# Patient Record
Sex: MALE | Race: WHITE | NOT HISPANIC OR LATINO
[De-identification: names, ages, dates, MRNs, and addresses within clinical notes are randomized per-mention and may not be internally consistent; named-entity substitution may affect disease eponyms.]

---

## 2021-06-26 ENCOUNTER — RESULT REVIEW (OUTPATIENT)
Age: 46
End: 2021-06-26

## 2021-07-23 ENCOUNTER — APPOINTMENT (OUTPATIENT)
Dept: FAMILY MEDICINE | Facility: CLINIC | Age: 46
End: 2021-07-23
Payer: COMMERCIAL

## 2021-07-23 ENCOUNTER — TRANSCRIPTION ENCOUNTER (OUTPATIENT)
Age: 46
End: 2021-07-23

## 2021-07-23 VITALS
HEIGHT: 70 IN | RESPIRATION RATE: 16 BRPM | OXYGEN SATURATION: 98 % | HEART RATE: 81 BPM | WEIGHT: 238 LBS | BODY MASS INDEX: 34.07 KG/M2 | TEMPERATURE: 99.5 F | SYSTOLIC BLOOD PRESSURE: 148 MMHG | DIASTOLIC BLOOD PRESSURE: 84 MMHG

## 2021-07-23 DIAGNOSIS — S16.1XXA STRAIN OF MUSCLE, FASCIA AND TENDON AT NECK LEVEL, INITIAL ENCOUNTER: ICD-10-CM

## 2021-07-23 PROCEDURE — 36415 COLL VENOUS BLD VENIPUNCTURE: CPT

## 2021-07-23 PROCEDURE — G0442 ANNUAL ALCOHOL SCREEN 15 MIN: CPT

## 2021-07-23 PROCEDURE — 99386 PREV VISIT NEW AGE 40-64: CPT | Mod: 25

## 2021-07-23 PROCEDURE — 99072 ADDL SUPL MATRL&STAF TM PHE: CPT

## 2021-07-23 PROCEDURE — G0444 DEPRESSION SCREEN ANNUAL: CPT | Mod: 59

## 2021-07-23 PROCEDURE — G0447 BEHAVIOR COUNSEL OBESITY 15M: CPT

## 2021-07-26 PROBLEM — S16.1XXA NECK STRAIN: Status: ACTIVE | Noted: 2021-07-23

## 2021-07-26 RX ORDER — ASPIRIN 81 MG
81 TABLET,CHEWABLE ORAL DAILY
Refills: 0 | Status: ACTIVE | COMMUNITY

## 2021-07-26 NOTE — COUNSELING
[Behavioral health counseling provided] : Behavioral health counseling provided [Engage in a relaxing activity] : Engage in a relaxing activity [Risk of tobacco use and health benefits of smoking cessation discussed] : Risk of tobacco use and health benefits of smoking cessation discussed [Hazards of at-risk alcohol use discussed] : Hazards of at-risk alcohol use discussed [Strategies to reduce or eliminate alcohol use discussed] : Strategies to reduce or eliminate alcohol use discussed [Potential consequences of obesity discussed] : Potential consequences of obesity discussed [Benefits of weight loss discussed] : Benefits of weight loss discussed [Encouraged to maintain food diary] : Encouraged to maintain food diary [Encouraged to increase physical activity] : Encouraged to increase physical activity [Encouraged to use exercise tracking device] : Encouraged to use exercise tracking device [Weigh Self Weekly] : weigh self weekly [Decrease Portions] : decrease portions [Good understanding] : Patient has a good understanding of disease, goals and obesity follow-up plan [FreeTextEntry4] : 15

## 2021-07-26 NOTE — HEALTH RISK ASSESSMENT
[] : Yes [Yes] : Yes [4 or more  times a week (4 pts)] : 4 or more  times a week (4 points) [3 or 4 (1 pt)] : 3 or 4  (1 point) [Weekly (3 pts)] : Weekly (3 points) [No falls in past year] : Patient reported no falls in the past year [0] : 2) Feeling down, depressed, or hopeless: Not at all (0) [PHQ-2 Negative - No further assessment needed] : PHQ-2 Negative - No further assessment needed [Good] : ~his/her~ current health as good [10-14] : 10-14 [No] : In the past 12 months have you used drugs other than those required for medical reasons? No [Audit-CScore] : 8 [de-identified] : decreased since move to [place] [de-identified] : varied [CFC4Nqobg] : 0 [Patient reported colonoscopy was normal] : Patient reported colonoscopy was normal [With Family] : lives with family [Employed] : employed [] :  [# Of Children ___] : has [unfilled] children [Sexually Active] : sexually active [High Risk Behavior] : no high risk behavior [Feels Safe at Home] : Feels safe at home [Fully functional (bathing, dressing, toileting, transferring, walking, feeding)] : Fully functional (bathing, dressing, toileting, transferring, walking, feeding) [Fully functional (using the telephone, shopping, preparing meals, housekeeping, doing laundry, using] : Fully functional and needs no help or supervision to perform IADLs (using the telephone, shopping, preparing meals, housekeeping, doing laundry, using transportation, managing medications and managing finances) [Reports changes in hearing] : Reports no changes in hearing [Reports changes in vision] : Reports no changes in vision [Reports normal functional visual acuity (ie: able to read med bottle)] : Reports normal functional visual acuity [Reports changes in dental health] : Reports no changes in dental health [ColonoscopyDate] : 2011 [FreeTextEntry2] : [place]

## 2021-07-26 NOTE — HISTORY OF PRESENT ILLNESS
Servando [FreeTextEntry1] : Annual physical [de-identified] : 47 yo M presenting for routine annual physical and ED follow up. Seen in [place] ED one week earlier for chest and upper back pain, likely cervical strain. However, given patient's hx of CAD, patient went to ED for care and to rule out heart pathology. In ED, patient given pain cocktail and pain significant improved. Has been under significant stress recently as just had birth of child and some minor complications caused many worries for him and his wife; baby all healthy at this point. Patient additionally reports he has not been sleeping well. Currently, pain is left sided, difficult to fully turn neck. Reports spasms have been present in neck x1 month with no significant relief. Denies new pills, or changes in sleeping patterns. Otherwise, patient denies exertional chest pain, shortness of breath, nausea/vomiting, diarrhea, constipation. He works in [employer] in [place] and has a very active job with heavy lifting. Patient initially from [place] but recently moved to [place]; previous care was all located in [place], including cardiologist.

## 2021-07-26 NOTE — PHYSICAL EXAM
[No Acute Distress] : no acute distress [Well Nourished] : well nourished [Well Developed] : well developed [Well-Appearing] : well-appearing [Normal Sclera/Conjunctiva] : normal sclera/conjunctiva [PERRL] : pupils equal round and reactive to light [EOMI] : extraocular movements intact [Normal Outer Ear/Nose] : the outer ears and nose were normal in appearance [Normal Oropharynx] : the oropharynx was normal [No JVD] : no jugular venous distention [No Lymphadenopathy] : no lymphadenopathy [Thyroid Normal, No Nodules] : the thyroid was normal and there were no nodules present [No Respiratory Distress] : no respiratory distress  [No Accessory Muscle Use] : no accessory muscle use [Clear to Auscultation] : lungs were clear to auscultation bilaterally [Normal Rate] : normal rate  [Regular Rhythm] : with a regular rhythm [Normal S1, S2] : normal S1 and S2 [No Murmur] : no murmur heard [No Carotid Bruits] : no carotid bruits [No Abdominal Bruit] : a ~M bruit was not heard ~T in the abdomen [No Varicosities] : no varicosities [Pedal Pulses Present] : the pedal pulses are present [No Edema] : there was no peripheral edema [No Palpable Aorta] : no palpable aorta [No Extremity Clubbing/Cyanosis] : no extremity clubbing/cyanosis [Soft] : abdomen soft [Non Tender] : non-tender [Non-distended] : non-distended [No Masses] : no abdominal mass palpated [No HSM] : no HSM [Normal Bowel Sounds] : normal bowel sounds [Normal Posterior Cervical Nodes] : no posterior cervical lymphadenopathy [Normal Anterior Cervical Nodes] : no anterior cervical lymphadenopathy [No CVA Tenderness] : no CVA  tenderness [No Spinal Tenderness] : no spinal tenderness [No Joint Swelling] : no joint swelling [Grossly Normal Strength/Tone] : grossly normal strength/tone [No Rash] : no rash [Coordination Grossly Intact] : coordination grossly intact [No Focal Deficits] : no focal deficits [Normal Gait] : normal gait [Deep Tendon Reflexes (DTR)] : deep tendon reflexes were 2+ and symmetric [Normal Affect] : the affect was normal [Normal Insight/Judgement] : insight and judgment were intact [de-identified] : spasms of upper back muscles, left sided, FROM of neck in all planes

## 2021-07-26 NOTE — PLAN
[FreeTextEntry1] : 45 yo M here for annual physical\par - PHQ2 negative\par - weight and exercise discussed; needs to lose weight\par - alcohol and smoking reviewed and discussed with patient\par - cardio referral for hx of CAD and to establish care with [facility] cardiologist

## 2021-07-29 LAB
25(OH)D3 SERPL-MCNC: 14.3 NG/ML
ALBUMIN SERPL ELPH-MCNC: 4.6 G/DL
ALP BLD-CCNC: 79 U/L
ALT SERPL-CCNC: 47 U/L
ANION GAP SERPL CALC-SCNC: 14 MMOL/L
AST SERPL-CCNC: 36 U/L
BASOPHILS # BLD AUTO: 0.06 K/UL
BASOPHILS NFR BLD AUTO: 0.8 %
BILIRUB SERPL-MCNC: 0.3 MG/DL
BUN SERPL-MCNC: 14 MG/DL
CALCIUM SERPL-MCNC: 9.3 MG/DL
CHLORIDE SERPL-SCNC: 103 MMOL/L
CHOLEST SERPL-MCNC: 173 MG/DL
CO2 SERPL-SCNC: 23 MMOL/L
CREAT SERPL-MCNC: 0.99 MG/DL
EOSINOPHIL # BLD AUTO: 0.11 K/UL
EOSINOPHIL NFR BLD AUTO: 1.4 %
ESTIMATED AVERAGE GLUCOSE: 105 MG/DL
GLUCOSE SERPL-MCNC: 109 MG/DL
HBA1C MFR BLD HPLC: 5.3 %
HCT VFR BLD CALC: 49.5 %
HDLC SERPL-MCNC: 44 MG/DL
HGB BLD-MCNC: 16.4 G/DL
IMM GRANULOCYTES NFR BLD AUTO: 0.4 %
LDLC SERPL CALC-MCNC: 57 MG/DL
LYMPHOCYTES # BLD AUTO: 1.55 K/UL
LYMPHOCYTES NFR BLD AUTO: 19.5 %
MAN DIFF?: NORMAL
MCHC RBC-ENTMCNC: 30.4 PG
MCHC RBC-ENTMCNC: 33.1 GM/DL
MCV RBC AUTO: 91.7 FL
MONOCYTES # BLD AUTO: 0.6 K/UL
MONOCYTES NFR BLD AUTO: 7.5 %
NEUTROPHILS # BLD AUTO: 5.61 K/UL
NEUTROPHILS NFR BLD AUTO: 70.4 %
NONHDLC SERPL-MCNC: 129 MG/DL
PLATELET # BLD AUTO: 186 K/UL
POTASSIUM SERPL-SCNC: 4.3 MMOL/L
PROT SERPL-MCNC: 7.1 G/DL
RBC # BLD: 5.4 M/UL
RBC # FLD: 12.8 %
SODIUM SERPL-SCNC: 141 MMOL/L
TRIGL SERPL-MCNC: 363 MG/DL
TSH SERPL-ACNC: 1.88 UIU/ML
WBC # FLD AUTO: 7.96 K/UL

## 2021-08-09 ENCOUNTER — APPOINTMENT (OUTPATIENT)
Dept: PAIN MANAGEMENT | Facility: CLINIC | Age: 46
End: 2021-08-09
Payer: COMMERCIAL

## 2021-08-09 ENCOUNTER — NON-APPOINTMENT (OUTPATIENT)
Age: 46
End: 2021-08-09

## 2021-08-09 VITALS
WEIGHT: 238 LBS | DIASTOLIC BLOOD PRESSURE: 91 MMHG | BODY MASS INDEX: 34.07 KG/M2 | SYSTOLIC BLOOD PRESSURE: 135 MMHG | TEMPERATURE: 98.2 F | HEIGHT: 70 IN

## 2021-08-09 PROCEDURE — 99244 OFF/OP CNSLTJ NEW/EST MOD 40: CPT

## 2021-08-09 NOTE — REASON FOR VISIT
[Initial Consultation] : an initial pain management consultation [FreeTextEntry2] : referred by Dr. Cifuentes  for evaluation of neck pain

## 2021-08-09 NOTE — PHYSICAL EXAM
[Spurling] : positive Spurling's Test [] : Motor: [NL] : normal and symmetric bilaterally [de-identified] : Constitutional: Normal, well developed, no acute distress\par Eyes: Symmetric, External structures \par Oropharynx: Lips normal, symmetric, no external lesions appreciated\par Respiratory: Non-labored breathing, no audible wheezes\par Cardiac: Pulse palpated, no tachycardia\par Vascular: No cyanosis appreciated, no edema in bilateral lower extremities\par GI: Nondistended, no jaundice appreciated\par Neurovascular: CN2-12 grossly intact, Alert and oriented\par MSK: Normal muscle bulk, 5/5 Motor strength B/L in LE\par \par

## 2021-08-09 NOTE — CONSULT LETTER
[Dear  ___] : Dear  [unfilled], [Consult Letter:] : I had the pleasure of evaluating your patient, [unfilled]. [Please see my note below.] : Please see my note below. [Consult Closing:] : Thank you very much for allowing me to participate in the care of this patient.  If you have any questions, please do not hesitate to contact me. [Sincerely,] : Sincerely, [FreeTextEntry3] : \par Destin Carlson DO, MBA\par Director, Pain Management Center\par  of Anesthesiology\par [facility] [employer] [facility]\par \par \par

## 2021-08-09 NOTE — ASSESSMENT
[FreeTextEntry1] : Neck and arm pain likely secondary to cervical radiculopathy and discogenic pain vs spondylosis refractory to conservative treatments including 6 consecutive weeks of PT/home exercises, will obtain MRI CS to evaluate for pathology\par \par may consider PT vs intervention pending eval\par \par PT functional evaluation\par \par gabapentin nightly - caution sedation and not to take prior to driving\par cautioned change in mood.  Encouraged to call with any worsening mood or depression/suicidal ideations\par \par The above diagnosis and treatment plan is medically reasonable and necessary based on the patient encounter.\par \par There were no barriers to communication.\par Informed patient that I would be available for any additional questions.\par Patient was instructed to call with any worsening symptoms including severe pain, new numbness/weakness, or changes in the bowel/bladder function. \par \par Discussed role of nsaids in pain management and all relevant risks, if patient is continuing to require after 4 weeks the patient should f/u for alternative treatment. \par \par Instructed patient to maintain pain diary to monitor pain level, mobility, and function.\par \par The referring provider was informed of the above diagnosis and treatment plan.\par

## 2021-08-09 NOTE — HISTORY OF PRESENT ILLNESS
[Back Pain] : back pain [___ wks] : [unfilled] week(s) ago [Constant] : constant [8] : a maximum pain level of 8/10 [Sharp] : sharp [Aching] : aching [Throbbing] : throbbing [Shooting] : shooting [Laying] : laying [Sitting] : sitting [Medications] : medications [Heat] : heat [FreeTextEntry1] : HPI\par \par Mr. KADE YOUNG is a 46 year M with pmhx of cad asa 81mg, presents with left neck pain radiating shoulder, lateral arm pain  Pain is so bad that patient finds it difficult to perform adls. denies any worsening numbness, weakness, bowel/bladder dysfunction. Worse at night. 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

## 2021-08-09 NOTE — REVIEW OF SYSTEMS
[Constipation] : constipation [Urinary Frequency] : urinary frequency [Back Pain] : back pain [Neck Pain] : neck pain [Joint Pain] : joint pain [Negative] : Heme/Lymph

## 2021-08-12 ENCOUNTER — RESULT REVIEW (OUTPATIENT)
Age: 46
End: 2021-08-12

## 2021-08-17 ENCOUNTER — APPOINTMENT (OUTPATIENT)
Dept: PAIN MANAGEMENT | Facility: CLINIC | Age: 46
End: 2021-08-17
Payer: COMMERCIAL

## 2021-08-17 PROCEDURE — 99214 OFFICE O/P EST MOD 30 MIN: CPT | Mod: 95

## 2021-08-17 NOTE — ASSESSMENT
[FreeTextEntry1] : Neck and arm pain likely secondary to cervical radiculopathy and discogenic pain vs spondylosis refractory to conservative treatments including 6 consecutive weeks of PT/home exercises, sp MRI CS to evaluate for pathology\par \par I personally reviewed the relevant imaging.  Discussed and explained to patient the likely source of pathology and pain.  Questions answered.\par \par \par MRI demonstrating disc herniation causing radiculopathy, significantly impactful to ability to perform ADL and refractory to conservative treatments, severe pain and unable to return to work.  Will schedule C7-T1 interlaminar epidural steroid injection r/b/a discussed\par \par informed patient of risks of steroid administration including transient worsening of blood glucose, htn, mood changes, progressive osteoporosis.  Encouraged to call with questions and concerns.\par \par patient will consult with cardiologist regarding hold asa 81mg  7 days prior to intervention, it is being used for possibly 2ndary prophylaxis, in which case it can be performed with asa as well\par \par Patient is scheduled for procedure based on history, imaging and limited physical exam performed on TeleHealth visit.  If necessary, additional focal physical exam will be performed on date of procedure\par \par Will schedule above interventional pain procedure because further delay may cause harm or negative outcome to patient.  The goal in performing this procedure is to avoid deterioration of function, emergency room visits (which increases exposure) and reliance on opioids.  \par \par r/b/a discussed with patient, lack of evidence to conclusively determine whether pain management procedures have any positive or negative impact on the possibility of danial the virus and/or development of any sequelae. \par \par Patient counselled regarding timing steroid based intervention 2 weeks before or after COVID-19 vaccine administration to avoid any interaction or affect on efficacy of vaccination\par \par Patient demonstrates understanding\par \par Informed patient that risks associated with the COVID-19 infection.  Informed patient steps taken to limit the risks.  We are implementing safety precautions and following protocols consistent with the CDC and state recommendations. All patients and staff will be checked for fever or signs of illness upon entry to the facility. We will limit our steroid dose to the lowest effective therapeutic dose or in some cases steroids will not be injected at all. \par \par Patient agrees to proceed\par \par \par PT functional evaluation\par \par gabapentin nightly - caution sedation and not to take prior to driving\par cautioned change in mood.  Encouraged to call with any worsening mood or depression/suicidal ideations\par \par The above diagnosis and treatment plan is medically reasonable and necessary based on the patient encounter.\par \par There were no barriers to communication.\par Informed patient that I would be available for any additional questions.\par Patient was instructed to call with any worsening symptoms including severe pain, new numbness/weakness, or changes in the bowel/bladder function. \par \par Discussed role of nsaids in pain management and all relevant risks, if patient is continuing to require after 4 weeks the patient should f/u for alternative treatment. \par \par Instructed patient to maintain pain diary to monitor pain level, mobility, and function.\par \par The referring provider was informed of the above diagnosis and treatment plan.\par

## 2021-08-17 NOTE — HISTORY OF PRESENT ILLNESS
[Medical Office: ([facility])___] : at the medical office located in  [Home] : at home, [unfilled] , at the time of the visit. [Verbal consent obtained from patient] : the patient, [unfilled] [Back Pain] : back pain [___ wks] : [unfilled] week(s) ago [Constant] : constant [8] : a maximum pain level of 8/10 [Sharp] : sharp [Throbbing] : throbbing [Aching] : aching [Shooting] : shooting [Laying] : laying [Sitting] : sitting [Medications] : medications [Heat] : heat [FreeTextEntry1] : Interval Note:\par \par Since last visit the pain is mildly improved.  Continues to have pain over left neck and shoulder, unable to return to work and he was evaluated by PT for function eval and recommended modified duty. Denies any additional weakness, numbness, bowel/bladder dysfunction.\par \par \par HPI\par \par Mr. KADE YOUNG is a 46 year M with pmhx of cad asa 81mg, presents with left neck pain radiating shoulder, lateral arm pain  Pain is so bad that patient finds it difficult to perform adls. denies any worsening numbness, weakness, bowel/bladder dysfunction. Worse at night. \par  \par \par Previous and current pain medications/doses/effects:\par \par tizanidine with mild improvement\par tylenol with mild improvement\par \par Previous Pain Treatments:\par \par PT without improvement\par \par Previous Pain Injections:\par \par na\par \par Previous Diagnostic Studies/Images:\par \par MRI CS 8/21\par \par There is straightening of the cervical spine. The vertebral bodies are of normal height\par and configuration. There is mild loss of disc height and T2 signal at the C4/C5, C5/C6 disc spaces\par consistent with mild desiccation. The remaining intervertebral discs spaces are within normal\par limits. Evaluation of the spinal cord demonstrates no evidence of abnormal signal changes.  The\par marrow signal is within normal limits.  The cervicomedullary junction is normal.\par \par  Evaluation of the individual levels demonstrates at the C2/C3 no evidence of a focal disc\par herniation or spinal cord compression. The bilateral neuroforamen are patent.\par \par  At the C3/C4 level there is no evidence of a focal disc herniation. The bilateral neuroforamen are\par patent. There is no evidence of spinal cord compression.\par \par  At the C4/C5 level there is a minimal diffuse disc bulge contacting the ventral thecal sac and\par contributing to moderate right foraminal narrowing. The left neuroforamen is patent. There is no\par evidence of spinal cord compression.\par \par  At the C5/C6 level there is a mild diffuse disc bulge with a superimposed small shallow left\par subarticular and foraminal disc protrusion flattening the ventral thecal sac, left greater than\par right and contacting the left ventral spinal cord. There is uncovertebral and facet hypertrophy.\par There is moderate to severe bilateral foraminal narrowing, left greater than right. There is no\par evidence of spinal cord compression.\par \par  At the C6/C7 level there is a minimal diffuse disc bulge contacting the ventral thecal sac. There\par is mild bilateral foraminal narrowing. There is no evidence of spinal cord compression.\par \par  At the C7/T1 level there is no evidence of a focal disc herniation. There is moderate left and mild\par right foraminal narrowing. There is no evidence of spinal cord compression.\par \par \par  Impression:\par \par  Straightening of the cervical spine.\par \par  C4/C5  minimal diffuse disc bulge  contributing to moderate right foraminal narrowing. C4/C5 no\par evidence of spinal cord compression.\par \par  C5/C6 mild diffuse disc bulge with a superimposed small shallow left subarticular and foraminal\par disc protrusion contacting the left ventral spinal cord with moderate to severe bilateral foraminal\par narrowing, left greater than right. C5/C6 no evidence of spinal cord compression.\par \par  C6/C7 minimal diffuse disc bulge with mild bilateral foraminal narrowing. 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

## 2021-08-17 NOTE — PHYSICAL EXAM
[de-identified] : \par Constitutional: Normal, well developed, no acute distress on audio/video examination\par Eyes: Symmetric, External structures on video examination\par ENT: Lips, mucosa and tongue normal on video examination\par Oropharynx: Lips normal, symmetric, no external lesions appreciated appreciated on video examination\par Respiratory: Non-labored breathing, no audible wheezes appreciated on audio/video examination\par Vascular: No cyanosis appreciated or edema appreciated on video examination\par GI:  no jaundice appreciated on video examination\par Neurovascular: CN grossly intact on video/audio examination, alert\par MSK: Normal muscle bulk on video examination\par

## 2021-08-19 ENCOUNTER — RX CHANGE (OUTPATIENT)
Age: 46
End: 2021-08-19

## 2021-08-19 RX ORDER — ERGOCALCIFEROL 1.25 MG/1
1.25 MG CAPSULE, LIQUID FILLED ORAL
Qty: 4 | Refills: 1 | Status: DISCONTINUED | COMMUNITY
Start: 2021-07-29 | End: 2021-08-19

## 2021-08-19 RX ORDER — SIMVASTATIN 20 MG/1
20 TABLET, FILM COATED ORAL
Refills: 0 | Status: ACTIVE | COMMUNITY

## 2021-08-19 NOTE — PHYSICAL EXAM
[Well Developed] : well developed [Well Nourished] : well nourished [No Acute Distress] : no acute distress [Normal Conjunctiva] : normal conjunctiva [Normal Venous Pressure] : normal venous pressure [No Carotid Bruit] : no carotid bruit [Normal S1, S2] : normal S1, S2 [No Murmur] : no murmur [No Rub] : no rub [No Gallop] : no gallop [Clear Lung Fields] : clear lung fields [Good Air Entry] : good air entry [No Respiratory Distress] : no respiratory distress  [Soft] : abdomen soft [Non Tender] : non-tender [No Masses/organomegaly] : no masses/organomegaly [Normal Bowel Sounds] : normal bowel sounds [Normal Gait] : normal gait [No Edema] : no edema [No Clubbing] : no clubbing [No Cyanosis] : no cyanosis [No Varicosities] : no varicosities [No Rash] : no rash [No Skin Lesions] : no skin lesions [Moves all extremities] : moves all extremities [No Focal Deficits] : no focal deficits [Normal Speech] : normal speech [Alert and Oriented] : alert and oriented [Normal memory] : normal memory

## 2021-08-26 ENCOUNTER — NON-APPOINTMENT (OUTPATIENT)
Age: 46
End: 2021-08-26

## 2021-08-26 ENCOUNTER — APPOINTMENT (OUTPATIENT)
Dept: CARDIOLOGY | Facility: CLINIC | Age: 46
End: 2021-08-26
Payer: COMMERCIAL

## 2021-08-26 VITALS
WEIGHT: 36 LBS | RESPIRATION RATE: 16 BRPM | DIASTOLIC BLOOD PRESSURE: 100 MMHG | OXYGEN SATURATION: 97 % | HEART RATE: 82 BPM | BODY MASS INDEX: 5.15 KG/M2 | HEIGHT: 70 IN | SYSTOLIC BLOOD PRESSURE: 148 MMHG

## 2021-08-26 PROCEDURE — 93000 ELECTROCARDIOGRAM COMPLETE: CPT

## 2021-08-26 PROCEDURE — 99204 OFFICE O/P NEW MOD 45 MIN: CPT

## 2021-08-26 RX ORDER — CYCLOBENZAPRINE HYDROCHLORIDE 5 MG/1
5 TABLET, FILM COATED ORAL
Qty: 20 | Refills: 0 | Status: COMPLETED | COMMUNITY
Start: 2021-07-09 | End: 2021-08-26

## 2021-08-26 RX ORDER — LOSARTAN POTASSIUM 50 MG/1
50 TABLET, FILM COATED ORAL DAILY
Refills: 0 | Status: DISCONTINUED | COMMUNITY
End: 2021-08-26

## 2021-08-26 NOTE — HISTORY OF PRESENT ILLNESS
[FreeTextEntry1] : The patient is a 46 year-old man referred by Dr. Cifuentes for evaluation of chest pain.\par \par The patient recently relocated to [place] from [facility] and had been under the care of a cardiologist there.\par \par He reports a past history of a NSTEMI 12 years ago. He was initially treated at [facility] in [place], where he was told of a small mi. He was transferred to [facility] in [facility], where CARDIAC CATH revealed nonobstructive dx, with a single 60% stenosis. He was treated medically.\par He quot smoking then (formerly 1/2 PPD--12 yrs ago). He does now smoke cigars\par \par Recent Scenario: The patient presented to [facility] complaining of chest discomfort. An acute coronary syndrome was ruled out and he was discharged. He was referred here.\par \par \par ----------------------------------------------------------------------------------------------------------------------------\par His past medical history is notable for:\par -Former cigarette smoker.\par - history of non-obstructive CAD and prior MI\par -Essential hypertension--on Losartan and metoprolol\par -Hyperlipidemia--on Simvastatin\par -Strong family history of CAD\par \par Labs: (July 2021):\par Chol 173,  LDL 57,    HDL 44,     TRig  363\par A1c 5.3\par CBC, CMP--Normal\par \par ECG today--NSR WNL\par

## 2021-08-26 NOTE — ASSESSMENT
[FreeTextEntry1] : #1) reported history of nonobstructive CAD with a 60% lesion more than a decade ago. No recent cardiovascular evaluation since. Recent ER visit for chest pain with typical and atypical features. No evidence of ACS. He is already on aspirin and beta blocker as well as statin.\par -We'll perform stress echocardiogram to re- risk stratify\par \par #2) essential hypertension and his blood pressure remains significantly elevated. I do believe that he would benefit from a diuretic and will also benefit from him his medications.\par -We'll discontinue metoprolol tartrate 25 b.i.d. and replace it with metoprolol succinate ER 50 mg once daily.\par -We'll discontinue losartan 50 and replace this with losartan hydrochlorothiazide 100/12.5\par \par #3) hyperlipidemia--on statin and at goal.\par \par #4) mild obesity\par \par #5) former cigarette smoker now smoking cigars. Patient advised to quit and provided with details how to do so\par \par Plan: And losartan hydrochlorothiazide, change metoprolol to succinate ER 50,\par           Schedule stress echocardiogram\par           Continue daily aspirin and simvastatin\par           Cessation of cigar smoking\par           Return to office to review stress test and recheck blood pressure

## 2021-08-30 ENCOUNTER — NON-APPOINTMENT (OUTPATIENT)
Age: 46
End: 2021-08-30

## 2021-09-02 ENCOUNTER — APPOINTMENT (OUTPATIENT)
Dept: PAIN MANAGEMENT | Facility: HOSPITAL | Age: 46
End: 2021-09-02

## 2021-09-13 ENCOUNTER — RESULT REVIEW (OUTPATIENT)
Age: 46
End: 2021-09-13

## 2021-09-14 ENCOUNTER — APPOINTMENT (OUTPATIENT)
Dept: PAIN MANAGEMENT | Facility: CLINIC | Age: 46
End: 2021-09-14
Payer: COMMERCIAL

## 2021-09-14 DIAGNOSIS — M54.12 RADICULOPATHY, CERVICAL REGION: ICD-10-CM

## 2021-09-14 DIAGNOSIS — M79.2 NEURALGIA AND NEURITIS, UNSPECIFIED: ICD-10-CM

## 2021-09-14 DIAGNOSIS — M79.18 MYALGIA, OTHER SITE: ICD-10-CM

## 2021-09-14 PROCEDURE — 99213 OFFICE O/P EST LOW 20 MIN: CPT | Mod: 95

## 2021-09-14 NOTE — ASSESSMENT
[FreeTextEntry1] : Neck and arm pain likely secondary to cervical radiculopathy and discogenic pain vs spondylosis refractory to conservative treatments including 6 consecutive weeks of PT/home exercises, sp MRI CS to evaluate for pathology\par \par I personally reviewed the relevant imaging.  Discussed and explained to patient the likely source of pathology and pain.  Questions answered.\par \par Pain is now much improved with PT\par \par patient able to return to work \par \par continue PT\par \par The above diagnosis and treatment plan is medically reasonable and necessary based on the patient encounter.\par \par There were no barriers to communication.\par Informed patient that I would be available for any additional questions.\par Patient was instructed to call with any worsening symptoms including severe pain, new numbness/weakness, or changes in the bowel/bladder function. \par \par Discussed role of nsaids in pain management and all relevant risks, if patient is continuing to require after 4 weeks the patient should f/u for alternative treatment. \par \par Instructed patient to maintain pain diary to monitor pain level, mobility, and function.\par \par I explained to patient benefits and limitation of TeleMedicine visits\par \par Patient understands that limitations include inability to perform comprehensive physical exam, which may lead to potential diagnostic inconsistencies.  \par \par Patient understands that diagnosis and treatment may be limited by these inconsistencies and patient agrees to proceed with care plan\par \par \par \par

## 2021-09-14 NOTE — PHYSICAL EXAM
[de-identified] : \par Constitutional: Normal, well developed, no acute distress on audio/video examination\par Eyes: Symmetric, External structures on video examination\par ENT: Lips, mucosa and tongue normal on video examination\par Oropharynx: Lips normal, symmetric, no external lesions appreciated appreciated on video examination\par Respiratory: Non-labored breathing, no audible wheezes appreciated on audio/video examination\par Vascular: No cyanosis appreciated or edema appreciated on video examination\par GI:  no jaundice appreciated on video examination\par Neurovascular: CN grossly intact on video/audio examination, alert\par MSK: Normal muscle bulk on video examination\par

## 2021-09-14 NOTE — HISTORY OF PRESENT ILLNESS
[Back Pain] : back pain [___ wks] : [unfilled] week(s) ago [Constant] : constant [8] : a maximum pain level of 8/10 [Sharp] : sharp [Aching] : aching [Throbbing] : throbbing [Shooting] : shooting [Laying] : laying [Sitting] : sitting [Medications] : medications [Heat] : heat [Home] : at home, [unfilled] , at the time of the visit. [Medical Office: ([facility])___] : at the medical office located in  [Verbal consent obtained from patient] : the patient, [unfilled] [FreeTextEntry1] : Interval Note:\par \par Patient is 95% improved in pain and has no difficulty moving his neck or performing adls. He continues PT. denies any worsening numbness, weakness, bowel/bladder dysfunction. \par \par \par HPI\par \par Mr. KADE YOUNG is a 46 year M with pmhx of cad asa 81mg, presents with left neck pain radiating shoulder, lateral arm pain  Pain is so bad that patient finds it difficult to perform adls. denies any worsening numbness, weakness, bowel/bladder dysfunction. Worse at night. \par  \par \par Previous and current pain medications/doses/effects:\par \par tizanidine with mild improvement\par tylenol with mild improvement\par \par Previous Pain Treatments:\par \par PT without improvement\par \par Previous Pain Injections:\par \par na\par \par Previous Diagnostic Studies/Images:\par \par MRI CS 8/21\par \par There is straightening of the cervical spine. The vertebral bodies are of normal height\par and configuration. There is mild loss of disc height and T2 signal at the C4/C5, C5/C6 disc spaces\par consistent with mild desiccation. The remaining intervertebral discs spaces are within normal\par limits. Evaluation of the spinal cord demonstrates no evidence of abnormal signal changes.  The\par marrow signal is within normal limits.  The cervicomedullary junction is normal.\par \par  Evaluation of the individual levels demonstrates at the C2/C3 no evidence of a focal disc\par herniation or spinal cord compression. The bilateral neuroforamen are patent.\par \par  At the C3/C4 level there is no evidence of a focal disc herniation. The bilateral neuroforamen are\par patent. There is no evidence of spinal cord compression.\par \par  At the C4/C5 level there is a minimal diffuse disc bulge contacting the ventral thecal sac and\par contributing to moderate right foraminal narrowing. The left neuroforamen is patent. There is no\par evidence of spinal cord compression.\par \par  At the C5/C6 level there is a mild diffuse disc bulge with a superimposed small shallow left\par subarticular and foraminal disc protrusion flattening the ventral thecal sac, left greater than\par right and contacting the left ventral spinal cord. There is uncovertebral and facet hypertrophy.\par There is moderate to severe bilateral foraminal narrowing, left greater than right. There is no\par evidence of spinal cord compression.\par \par  At the C6/C7 level there is a minimal diffuse disc bulge contacting the ventral thecal sac. There\par is mild bilateral foraminal narrowing. There is no evidence of spinal cord compression.\par \par  At the C7/T1 level there is no evidence of a focal disc herniation. There is moderate left and mild\par right foraminal narrowing. There is no evidence of spinal cord compression.\par \par \par  Impression:\par \par  Straightening of the cervical spine.\par \par  C4/C5  minimal diffuse disc bulge  contributing to moderate right foraminal narrowing. C4/C5 no\par evidence of spinal cord compression.\par \par  C5/C6 mild diffuse disc bulge with a superimposed small shallow left subarticular and foraminal\par disc protrusion contacting the left ventral spinal cord with moderate to severe bilateral foraminal\par narrowing, left greater than right. C5/C6 no evidence of spinal cord compression.\par \par  C6/C7 minimal diffuse disc bulge with mild bilateral foraminal narrowing. 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

## 2021-10-14 ENCOUNTER — APPOINTMENT (OUTPATIENT)
Dept: CARDIOLOGY | Facility: CLINIC | Age: 46
End: 2021-10-14

## 2021-10-14 NOTE — HISTORY OF PRESENT ILLNESS
[FreeTextEntry1] : The patient is a 46 year-old man referred by Dr. Cifuentes for evaluation of chest pain.\par \par The patient recently relocated to [place] from [facility] and had been under the care of a cardiologist there.\par \par He reports a past history of a NSTEMI 12 years ago. He was initially treated at [facility] in [place], where he was told of a small mi. He was transferred to [facility] in [facility], where CARDIAC CATH revealed nonobstructive dx, with a single 60% stenosis. He was treated medically.\par He quit smoking then (formerly 1/2 PPD--12 yrs ago). He does now smoke cigars\par \par Recent Scenario: The patient presented to [facility] complaining of chest discomfort. An acute coronary syndrome was ruled out and he was discharged. He was referred here.\par \par \par ----------------------------------------------------------------------------------------------------------------------------\par His past medical history is notable for:\par -Former cigarette smoker.\par - history of non-obstructive CAD and prior MI\par -Essential hypertension--on Losartan and metoprolol\par -Hyperlipidemia--on Simvastatin\par -Strong family history of CAD\par \par Labs: (July 2021):\par Chol 173,  LDL 57,    HDL 44,     TRig  363\par A1c 5.3\par CBC, CMP--Normal\par \par ECG --NSR WNL\par --------------------------------------------------------------------------------------------------------------------------------\par Stress echocardiogram(September 14, 2021):\par The patient exercised for 10 minutes and 59 seconds to a heart rate of 162 which is 92% of the maximum predicted.\par This is 13.3 METS\par Doing no chest pains and there were no symptoms. \par ****There were no abnormal ST segment changes.\par ****There was no echocardiographic evidence of myocardial ischemia.\par \par The echo itself revealed normal LV size, systolic function and no regional wall motion abnormalities.\par  Mild concentric LVH (1.3/1.3 cm square).\par Structurally normal mitral valve structurally normal trileaflet aortic valve.\par Conclusion: Mild concentric LVH, normal systolic function, no diagnostic abnormalities\par \par

## 2021-10-14 NOTE — ASSESSMENT
[FreeTextEntry1] : #1) Atypical and noncardiac pain this time with normal and low risk stress test. I believe his chest pain was noncardiac despite his past history.\par --Reported history of nonobstructive CAD with a 60% lesion more than a decade ago. No recent cardiovascular evaluation since. Recent ER visit for chest pain with typical and atypical features. No evidence of ACS. He is already on aspirin and beta blocker as well as statin.\par --To continue medical therapy .\par \par #2) essential hypertension and his blood pressure remains significantly elevated. I do believe that he would benefit from a diuretic and will also benefit from him his medications.\par -now on metoprolol succinate ER 50 mg once daily and  losartan hydrochlorothiazide 100/12.5\par \par #3) hyperlipidemia--on statin and at goal.\par \par #4) mild obesity\par \par #5) former cigarette smoker now smoking cigars. Patient advised to quit and provided with details how to do so\par \par

## 2021-10-18 ENCOUNTER — NON-APPOINTMENT (OUTPATIENT)
Age: 46
End: 2021-10-18

## 2021-10-18 DIAGNOSIS — Z72.0 TOBACCO USE: ICD-10-CM

## 2021-10-18 DIAGNOSIS — Z12.11 ENCOUNTER FOR SCREENING FOR MALIGNANT NEOPLASM OF COLON: ICD-10-CM

## 2021-10-18 NOTE — PHYSICAL EXAM
[General Appearance - Alert] : alert [General Appearance - In No Acute Distress] : in no acute distress [Sclera] : the sclera and conjunctiva were normal [Neck Appearance] : the appearance of the neck was normal [] : no respiratory distress [Auscultation Breath Sounds / Voice Sounds] : lungs were clear to auscultation bilaterally [Heart Rate And Rhythm] : heart rate was normal and rhythm regular [Heart Sounds] : normal S1 and S2 [Heart Sounds Gallop] : no gallops [Murmurs] : no murmurs [Heart Sounds Pericardial Friction Rub] : no pericardial rub [Edema] : there was no peripheral edema [Flat] : flat [Normal] : normal [Soft, Nontender] : the abdomen was soft and nontender [No Mass] : no masses were palpated [No HSM] : no hepatosplenomegaly noted [No CVA Tenderness] : no ~M costovertebral angle tenderness [Abnormal Walk] : normal gait [Skin Color & Pigmentation] : normal skin color and pigmentation [Oriented To Time, Place, And Person] : oriented to person, place, and time [Impaired Insight] : insight and judgment were intact

## 2021-10-22 ENCOUNTER — APPOINTMENT (OUTPATIENT)
Dept: FAMILY MEDICINE | Facility: CLINIC | Age: 46
End: 2021-10-22

## 2021-12-07 ENCOUNTER — RX RENEWAL (OUTPATIENT)
Age: 46
End: 2021-12-07

## 2021-12-07 NOTE — HISTORY OF PRESENT ILLNESS
[de-identified] : \par The patient   DID NOT COME   for the visit.\par \par This HPI is  in a note Titled:  Non - Appointment   and reflects a summary and review of records : including previous and most recent  Labs, body imaging, consults and progress notes, operative and pathology reports, EKG reports, ED records, found in [facility], [employer],  [facility] and any additional records brought in by  the patient at the time of the visit.\par \par It is for History taking purposes\par \par \par PCP: Tjdoyanira\par \par 47 yo M w h/o CAD--non obstructive 1 vessel dis by CC~ 12 yrs ago, NSTEMI, HTN, HLD\par Cervical Disc Dis, Vit D defic\par Obesity, BMI=35, + Snores, +DTF, neck=    , Stop-Bang=4, Mallampati=\par Hemorrhoids\par \par 12/9/21 21: Today:  Feeling well, no c/o , CP, SOB/ MAGALLANES, Cough, Wheeze, Palpitations, edema\par Most recent labs  reviewed w patient: cbc, cmet, tsh, HyqY8j--ebn, WY=038, vit D=14\par \par 12/9/21 Today: \par * Abd pain-no\par * Nausea-no\par * Vomit-no\par * Early satiety--no\par * Belching-no\par * Hiccups--no\par * Regurgitation-no\par * Acid Taste / Water Brash-no\par * Ht burn-no\par * Throat Clearing-no\par * Post-Nasal Drip-no\par * Congestion-no\par * Globus-no\par * Cough-no\par * Wheeze / PC--no\par * Hoarseness-no\par * Dysphagia-no\par * BMs: # 4 qd\par * Constipation-no\par * Diarrhea-no\par * Bloating-no\par * Strain on Defecation--no\par * Incompl Evac--no\par * Flatulence-no\par * Gurgling-no\par * Melena-no\par * BPBPR-no\par * Anorexia-no\par * Wt. Loss-no\par \par

## 2021-12-07 NOTE — ASSESSMENT
[FreeTextEntry1] : \par \par \par \par \par The patient has   NOT COME    to the VISIT \par \par This Assessment  is  in a note Titled:  Non - Appointment    which  only reflects a summary and review of records\par The Assessment below is tentative and preliminary.  It is based only\par on information gathered from chart review and will need modification once the History is taken from the patient and the patient is examined.\par \par \par \par \par 1. Hemorrhoids:  well - controlled.  No pain,  swelling,  itch,  bleeding\par * Discussed previously the  potential complications of thrombosis,  pain,  infection,  swelling, itching,  bleeding --none recently\par Recommendations: \par * Moderate- High  Fiber Diet was reviewed and emphasized\par * 6  --  8 cups of decaffeinated fluid daily was emphasized \par * Sitz Bathes as needed ,  No:  Anusol HC  Suppos / Cream  AK BID -- was needed\par * No:  Tucks BID,  Balneol Lotion,   Calmoseptine Oint -- was needed ;    can use  Prep H prn\par * No:  need for  Colorectal surgical evaluation for possible ablation \par \par \par \par \par 2. Colorectal  Neoplasia  Screening:  to be evaluated\par   Utilizing teaching posters and anatomical models the following were discussed and emphasized with the patient in detail: \par * Discussed the pre-malignant potential of polyps\par * Discussed the importance of f/u surveillance / screening colonoscopy \par * moderate-High  Fiber Diet was reviewed and emphasized\par * Anti-oxidants and ASA/NSAID Therapy emphasized\par * Given age > 40-51 yo\par * Recommend Colonoscopy  to  R/O  Colonic Neoplasia-- in 2021\par \par \par \par \par \par Informed Consent:\par * The risks & Benefits of   Colonoscopy were discussed w patient.\par * This included but was not limited to perforation, bleeding, sedation /med rxns possibly requiring surgery, blood transfusions, antibiotics & CPR/Intubation.\par * Pt. understands & agrees to the procedures.\par The following instructions in regards to the prep and medically essential ( cardiac, pulmonary, sz, psych, endocrine)  pre-op medication administration\par was reviewed and emphasized with the patient . \par * Pt. advised to D/C  ASA/NSAIDs  7  Days  PTP.\par * [ +++ ]  Dulcolax / Miralax / Mag. Citrate ,  [     ] Prepopik/ Clenpiq ,  [     ] Osmo Prep,  [    ] GoLytely,  prep. reviewed w Pt.\par * Hold  [           ] AM of procedure.\par * Hold  [           ] PM  before procedure.\par * Take  [           ] PM  before procedure.\par * Take  [  losartan/hctz;  metoprolol         ] AM of procedure.\par \par

## 2021-12-07 NOTE — REVIEW OF SYSTEMS
[As Noted in HPI] : as noted in HPI [Negative] : Respiratory [Red Eyes] : eyes not red [Dysuria] : no dysuria [Skin Lesions] : no skin lesions [Confused] : no confusion [Easy Bruising] : no tendency for easy bruising

## 2021-12-09 ENCOUNTER — APPOINTMENT (OUTPATIENT)
Dept: GASTROENTEROLOGY | Facility: CLINIC | Age: 46
End: 2021-12-09

## 2022-02-24 ENCOUNTER — RX RENEWAL (OUTPATIENT)
Age: 47
End: 2022-02-24

## 2022-08-19 ENCOUNTER — RX RENEWAL (OUTPATIENT)
Age: 47
End: 2022-08-19

## 2023-07-18 ENCOUNTER — APPOINTMENT (OUTPATIENT)
Dept: FAMILY MEDICINE | Facility: CLINIC | Age: 48
End: 2023-07-18
Payer: COMMERCIAL

## 2023-07-18 VITALS
HEIGHT: 70 IN | OXYGEN SATURATION: 97 % | DIASTOLIC BLOOD PRESSURE: 82 MMHG | WEIGHT: 240 LBS | TEMPERATURE: 98 F | SYSTOLIC BLOOD PRESSURE: 128 MMHG | BODY MASS INDEX: 34.36 KG/M2 | HEART RATE: 90 BPM

## 2023-07-18 DIAGNOSIS — F17.290 NICOTINE DEPENDENCE, OTHER TOBACCO PRODUCT, UNCOMPLICATED: ICD-10-CM

## 2023-07-18 DIAGNOSIS — E78.5 HYPERLIPIDEMIA, UNSPECIFIED: ICD-10-CM

## 2023-07-18 DIAGNOSIS — E66.3 OVERWEIGHT: ICD-10-CM

## 2023-07-18 PROCEDURE — G0444 DEPRESSION SCREEN ANNUAL: CPT | Mod: 59

## 2023-07-18 PROCEDURE — 99213 OFFICE O/P EST LOW 20 MIN: CPT | Mod: 25

## 2023-07-18 PROCEDURE — 99396 PREV VISIT EST AGE 40-64: CPT | Mod: 25

## 2023-07-18 PROCEDURE — 36415 COLL VENOUS BLD VENIPUNCTURE: CPT

## 2023-07-18 RX ORDER — METOPROLOL TARTRATE 25 MG/1
25 TABLET, FILM COATED ORAL
Qty: 180 | Refills: 0 | Status: DISCONTINUED | COMMUNITY
Start: 2021-03-21 | End: 2023-07-18

## 2023-07-18 RX ORDER — GABAPENTIN 300 MG/1
300 CAPSULE ORAL
Qty: 30 | Refills: 1 | Status: DISCONTINUED | COMMUNITY
Start: 2021-08-09 | End: 2023-07-18

## 2023-07-18 RX ORDER — ERGOCALCIFEROL 1.25 MG/1
1.25 MG CAPSULE, LIQUID FILLED ORAL
Qty: 12 | Refills: 1 | Status: DISCONTINUED | COMMUNITY
Start: 2021-08-19 | End: 2023-07-18

## 2023-07-18 RX ORDER — TIZANIDINE 4 MG/1
4 TABLET ORAL 3 TIMES DAILY
Qty: 21 | Refills: 0 | Status: DISCONTINUED | COMMUNITY
Start: 2021-07-23 | End: 2023-07-18

## 2023-07-27 PROBLEM — E78.5 HYPERLIPEMIA: Status: ACTIVE | Noted: 2021-07-23

## 2023-07-27 PROBLEM — E66.3 OVERWEIGHT (BMI 25.0-29.9): Status: ACTIVE | Noted: 2023-07-18

## 2023-07-27 NOTE — HEALTH RISK ASSESSMENT
[Yes] : Yes [Monthly or less (1 pt)] : Monthly or less (1 point) [1 or 2 (0 pts)] : 1 or 2 (0 points) [Never (0 pts)] : Never (0 points) [No falls in past year] : Patient reported no falls in the past year [0] : 2) Feeling down, depressed, or hopeless: Not at all (0) [Patient declined mammogram] : Patient declined mammogram [Patient declined PAP Smear] : Patient declined PAP Smear [Patient declined bone density test] : Patient declined bone density test [Patient declined colonoscopy] : Patient declined colonoscopy [Sexually Active] : sexually active [Fully functional (bathing, dressing, toileting, transferring, walking, feeding)] : Fully functional (bathing, dressing, toileting, transferring, walking, feeding) [Current] : Current [PHQ-2 Negative - No further assessment needed] : PHQ-2 Negative - No further assessment needed [MEW2Oyqvs] : 0 [ColonoscopyComments] : 10 years ago [de-identified] : occasional cigar

## 2023-07-28 LAB
25(OH)D3 SERPL-MCNC: 18.9 NG/ML
ALBUMIN SERPL ELPH-MCNC: 4.6 G/DL
ALP BLD-CCNC: 79 U/L
ALT SERPL-CCNC: 25 U/L
ANION GAP SERPL CALC-SCNC: 12 MMOL/L
APPEARANCE: CLEAR
AST SERPL-CCNC: 27 U/L
BACTERIA UR CULT: NORMAL
BACTERIA: NEGATIVE /HPF
BILIRUB SERPL-MCNC: 0.2 MG/DL
BILIRUBIN URINE: NEGATIVE
BLOOD URINE: NEGATIVE
BUN SERPL-MCNC: 17 MG/DL
CALCIUM SERPL-MCNC: 9.3 MG/DL
CAST: 0 /LPF
CHLORIDE SERPL-SCNC: 102 MMOL/L
CHOLEST SERPL-MCNC: 175 MG/DL
CO2 SERPL-SCNC: 26 MMOL/L
COLOR: YELLOW
CREAT SERPL-MCNC: 1.12 MG/DL
EGFR: 82 ML/MIN/1.73M2
EPITHELIAL CELLS: 0 /HPF
ESTIMATED AVERAGE GLUCOSE: 117 MG/DL
GLUCOSE QUALITATIVE U: NEGATIVE MG/DL
GLUCOSE SERPL-MCNC: 126 MG/DL
HBA1C MFR BLD HPLC: 5.7 %
HDLC SERPL-MCNC: 51 MG/DL
KETONES URINE: ABNORMAL MG/DL
LDLC SERPL CALC-MCNC: 66 MG/DL
LEUKOCYTE ESTERASE URINE: NEGATIVE
MICROSCOPIC-UA: NORMAL
NITRITE URINE: NEGATIVE
NONHDLC SERPL-MCNC: 124 MG/DL
PH URINE: 5.5
POTASSIUM SERPL-SCNC: 4.1 MMOL/L
PROT SERPL-MCNC: 7.2 G/DL
PROTEIN URINE: NEGATIVE MG/DL
PSA FREE FLD-MCNC: 51 %
PSA FREE SERPL-MCNC: 0.17 NG/ML
PSA SERPL-MCNC: 0.33 NG/ML
RED BLOOD CELLS URINE: 1 /HPF
SODIUM SERPL-SCNC: 140 MMOL/L
SPECIFIC GRAVITY URINE: >1.03
T4 FREE SERPL-MCNC: 1.2 NG/DL
TESTOST SERPL-MCNC: 278 NG/DL
TRIGL SERPL-MCNC: 377 MG/DL
TSH SERPL-ACNC: 2.47 UIU/ML
UROBILINOGEN URINE: 0.2 MG/DL
WHITE BLOOD CELLS URINE: 0 /HPF

## 2023-08-10 DIAGNOSIS — R79.89 OTHER SPECIFIED ABNORMAL FINDINGS OF BLOOD CHEMISTRY: ICD-10-CM

## 2023-08-11 ENCOUNTER — APPOINTMENT (OUTPATIENT)
Dept: INTERNAL MEDICINE | Facility: CLINIC | Age: 48
End: 2023-08-11

## 2023-08-16 ENCOUNTER — RX RENEWAL (OUTPATIENT)
Age: 48
End: 2023-08-16

## 2023-09-18 ENCOUNTER — NON-APPOINTMENT (OUTPATIENT)
Age: 48
End: 2023-09-18

## 2023-09-18 ENCOUNTER — APPOINTMENT (OUTPATIENT)
Dept: FAMILY MEDICINE | Facility: CLINIC | Age: 48
End: 2023-09-18
Payer: COMMERCIAL

## 2023-09-18 ENCOUNTER — LABORATORY RESULT (OUTPATIENT)
Age: 48
End: 2023-09-18

## 2023-09-18 VITALS
OXYGEN SATURATION: 97 % | HEART RATE: 111 BPM | BODY MASS INDEX: 34.36 KG/M2 | HEIGHT: 70 IN | TEMPERATURE: 99.7 F | WEIGHT: 240 LBS | DIASTOLIC BLOOD PRESSURE: 80 MMHG | SYSTOLIC BLOOD PRESSURE: 120 MMHG

## 2023-09-18 DIAGNOSIS — R00.0 TACHYCARDIA, UNSPECIFIED: ICD-10-CM

## 2023-09-18 DIAGNOSIS — I10 ESSENTIAL (PRIMARY) HYPERTENSION: ICD-10-CM

## 2023-09-18 DIAGNOSIS — I25.10 ATHEROSCLEROTIC HEART DISEASE OF NATIVE CORONARY ARTERY W/OUT ANGINA PECTORIS: ICD-10-CM

## 2023-09-18 DIAGNOSIS — K64.8 OTHER HEMORRHOIDS: ICD-10-CM

## 2023-09-18 PROCEDURE — 99214 OFFICE O/P EST MOD 30 MIN: CPT | Mod: 25

## 2023-09-18 PROCEDURE — 93000 ELECTROCARDIOGRAM COMPLETE: CPT

## 2023-09-18 PROCEDURE — 36415 COLL VENOUS BLD VENIPUNCTURE: CPT

## 2023-09-18 RX ORDER — NICOTINE 21 MG/24HR
14 PATCH, TRANSDERMAL 24 HOURS TRANSDERMAL DAILY
Qty: 28 | Refills: 1 | Status: DISCONTINUED | COMMUNITY
Start: 2023-07-18 | End: 2023-09-18

## 2023-09-22 LAB
ANION GAP SERPL CALC-SCNC: 12 MMOL/L
BASOPHILS # BLD AUTO: 0.02 K/UL
BASOPHILS NFR BLD AUTO: 0.3 %
BUN SERPL-MCNC: 15 MG/DL
CALCIUM SERPL-MCNC: 9.3 MG/DL
CHLORIDE SERPL-SCNC: 98 MMOL/L
CO2 SERPL-SCNC: 24 MMOL/L
CREAT SERPL-MCNC: 0.96 MG/DL
EGFR: 99 ML/MIN/1.73M2
EOSINOPHIL # BLD AUTO: 0.02 K/UL
EOSINOPHIL NFR BLD AUTO: 0.3 %
GLUCOSE SERPL-MCNC: 113 MG/DL
HCT VFR BLD CALC: 48.6 %
HGB BLD-MCNC: 15.7 G/DL
IMM GRANULOCYTES NFR BLD AUTO: 0.5 %
LYMPHOCYTES # BLD AUTO: 0.33 K/UL
LYMPHOCYTES NFR BLD AUTO: 5.1 %
MAN DIFF?: NORMAL
MCHC RBC-ENTMCNC: 27.6 PG
MCHC RBC-ENTMCNC: 32.3 GM/DL
MCV RBC AUTO: 85.4 FL
MONOCYTES # BLD AUTO: 0.45 K/UL
MONOCYTES NFR BLD AUTO: 6.9 %
NEUTROPHILS # BLD AUTO: 5.66 K/UL
NEUTROPHILS NFR BLD AUTO: 86.9 %
PLATELET # BLD AUTO: 176 K/UL
POTASSIUM SERPL-SCNC: 4.2 MMOL/L
RBC # BLD: 5.69 M/UL
RBC # FLD: 14.2 %
SODIUM SERPL-SCNC: 135 MMOL/L
WBC # FLD AUTO: 6.51 K/UL

## 2023-12-22 PROBLEM — K21.9 GERD (GASTROESOPHAGEAL REFLUX DISEASE): Status: ACTIVE | Noted: 2023-09-18

## 2023-12-22 PROBLEM — I25.118 CORONARY ARTERY DISEASE WITH STABLE ANGINA PECTORIS: Status: ACTIVE | Noted: 2021-08-26

## 2023-12-22 PROBLEM — R07.89 CHEST DISCOMFORT: Status: ACTIVE | Noted: 2021-08-26

## 2023-12-22 PROBLEM — Z00.00 ENCOUNTER FOR PREVENTIVE HEALTH EXAMINATION: Status: ACTIVE | Noted: 2021-07-23

## 2023-12-28 ENCOUNTER — APPOINTMENT (OUTPATIENT)
Dept: CARDIOLOGY | Facility: CLINIC | Age: 48
End: 2023-12-28
Payer: COMMERCIAL

## 2023-12-28 VITALS
SYSTOLIC BLOOD PRESSURE: 146 MMHG | DIASTOLIC BLOOD PRESSURE: 82 MMHG | HEIGHT: 70 IN | BODY MASS INDEX: 34.5 KG/M2 | HEART RATE: 85 BPM | OXYGEN SATURATION: 97 % | WEIGHT: 241 LBS

## 2023-12-28 DIAGNOSIS — K21.9 GASTRO-ESOPHAGEAL REFLUX DISEASE W/OUT ESOPHAGITIS: ICD-10-CM

## 2023-12-28 DIAGNOSIS — I25.118 ATHEROSCLEROTIC HEART DISEASE OF NATIVE CORONARY ARTERY WITH OTHER FORMS OF ANGINA PECTORIS: ICD-10-CM

## 2023-12-28 DIAGNOSIS — R07.89 OTHER CHEST PAIN: ICD-10-CM

## 2023-12-28 DIAGNOSIS — Z00.00 ENCOUNTER FOR GENERAL ADULT MEDICAL EXAMINATION W/OUT ABNORMAL FINDINGS: ICD-10-CM

## 2023-12-28 PROCEDURE — 99213 OFFICE O/P EST LOW 20 MIN: CPT | Mod: 25

## 2023-12-28 PROCEDURE — 93000 ELECTROCARDIOGRAM COMPLETE: CPT

## 2023-12-28 PROCEDURE — 36415 COLL VENOUS BLD VENIPUNCTURE: CPT

## 2023-12-28 RX ORDER — FAMOTIDINE 10 MG/1
10 TABLET, FILM COATED ORAL
Qty: 60 | Refills: 1 | Status: DISCONTINUED | COMMUNITY
Start: 2023-09-18 | End: 2023-12-28

## 2023-12-28 NOTE — HISTORY OF PRESENT ILLNESS
[FreeTextEntry1] : The patient is a 48-year-old man referred by Dr. Cifuentes for evaluation of chest pain. He was seen previously in October, 2021  The patient relocated to [place] from [facility] and had been under the care of a cardiologist there.  He reports a past history of a NSTEMI 15 years ago. He was initially treated at [facility] in [place], where he was told of a small mi. He was transferred to [facility] in [facility], where CARDIAC CATH revealed nonobstructive dx, with a single 60% stenosis. He was treated medically. He quit smoking then (formerly 1/2 PPD--12 yrs ago). He does now smoke cigars  In 2021 The patient presented to [facility] complaining of chest discomfort. An acute coronary syndrome was ruled out and he was discharged. He was referred here. A stress test was normal.  Now, 2+ years later:He is actually feeling well.  No recent cardiovascular symptoms.  No chest discomfort.  He works out regularly.  He does both treadmill and weights.  He is able to do these without symptoms.  He has seen Dr. Pineda recently.He is still smoking cigars.  But, he is trying to quit.   ---------------------------------------------------------------------------------------------------------------------------- His past medical history is notable for: -Former cigarette smoker. - history of non-obstructive CAD and prior MI -Essential hypertension--on Losartan and metoprolol -Hyperlipidemia--on Simvastatin -Strong family history of CADl  ECG (today) --NSR WNL -------------------------------------------------------------------------------------------------------------------------------- Stress echocardiogram   (September 14, 2021): The patient exercised for 10 minutes and 59 seconds to a heart rate of 162 which is 92% of the maximum predicted. This is 13.3 METS Doing no chest pains and there were no symptoms.  ****There were no abnormal ST segment changes. ****There was no echocardiographic evidence of myocardial ischemia.  The echo itself revealed normal LV size, systolic function and no regional wall motion abnormalities.  Mild concentric LVH (1.3/1.3 cm square). Structurally normal mitral valve structurally normal trileaflet aortic valve. Conclusion: Mild concentric LVH, normal systolic function, no diagnostic abnormalities

## 2023-12-28 NOTE — REASON FOR VISIT
[FreeTextEntry1] : Atypical Chest pain syndrome with nl ETT Hx of NONOBSTRUCTIVE cad hypertension-on Losartan HCT and metoprolol Hyperlipidemia on Simvastatin Current cigar smoker  ****Last seen October 2021--2 + years ago

## 2023-12-28 NOTE — ASSESSMENT
[FreeTextEntry1] : Assessment Chest discomfort (786.59) (R07.89) Coronary artery disease with stable angina pectoris (414.00,413.9) (I25.118) Hypertension (401.9) (I10) Dyslipidemia (272.4) (E78.5) #1) Atypical and noncardiac pain this time in patient with prior nonobstructive CADD, with normal and low risk stress test. I believe his chest pain was noncardiac despite his past history. --Reported history of nonobstructive CAD with a 60% lesion more than a decade ago.  --To continue medical therapy. #2) essential hypertension and his blood pressure  -now on metoprolol succinate ER 50 mg once daily and losartan hydrochlorothiazide 100/12.5  #3) hyperlipidemia--on statin and at goal. #4) mild obesity In summary, he is doing well.  No recent chest discomfort.  He is fully exercising regularly including treadmill and weights.  He had a normal and low risk stress test just 2 years ago.  His physical exam is unremarkable.  His blood pressure is well-controlled.  His lipids are at goal on a statin.  Will repeat his blood work today and call him with results.  I will see him again in a year, sooner if symptoms warrant.  Will consider a follow-up stress test to reassure him after the next visit.

## 2023-12-29 LAB
ALBUMIN SERPL ELPH-MCNC: 4.5 G/DL
ALP BLD-CCNC: 73 U/L
ALT SERPL-CCNC: 38 U/L
ANION GAP SERPL CALC-SCNC: 10 MMOL/L
AST SERPL-CCNC: 30 U/L
BILIRUB SERPL-MCNC: 0.3 MG/DL
BUN SERPL-MCNC: 17 MG/DL
CALCIUM SERPL-MCNC: 9.6 MG/DL
CHLORIDE SERPL-SCNC: 101 MMOL/L
CHOLEST SERPL-MCNC: 184 MG/DL
CO2 SERPL-SCNC: 29 MMOL/L
CREAT SERPL-MCNC: 1.04 MG/DL
EGFR: 90 ML/MIN/1.73M2
ESTIMATED AVERAGE GLUCOSE: 117 MG/DL
GLUCOSE SERPL-MCNC: 73 MG/DL
HBA1C MFR BLD HPLC: 5.7 %
HCT VFR BLD CALC: 44.8 %
HDLC SERPL-MCNC: 51 MG/DL
HGB BLD-MCNC: 14.6 G/DL
LDLC SERPL CALC-MCNC: 100 MG/DL
MCHC RBC-ENTMCNC: 28.2 PG
MCHC RBC-ENTMCNC: 32.6 GM/DL
MCV RBC AUTO: 86.5 FL
NONHDLC SERPL-MCNC: 133 MG/DL
PLATELET # BLD AUTO: 211 K/UL
POTASSIUM SERPL-SCNC: 4.4 MMOL/L
PROT SERPL-MCNC: 7 G/DL
RBC # BLD: 5.18 M/UL
RBC # FLD: 14.1 %
SODIUM SERPL-SCNC: 139 MMOL/L
TRIGL SERPL-MCNC: 193 MG/DL
WBC # FLD AUTO: 8.26 K/UL

## 2024-01-26 ENCOUNTER — APPOINTMENT (OUTPATIENT)
Dept: FAMILY MEDICINE | Facility: CLINIC | Age: 49
End: 2024-01-26

## 2024-02-09 ENCOUNTER — RX RENEWAL (OUTPATIENT)
Age: 49
End: 2024-02-09

## 2024-02-09 RX ORDER — METOPROLOL SUCCINATE 50 MG/1
50 TABLET, EXTENDED RELEASE ORAL
Qty: 90 | Refills: 2 | Status: ACTIVE | COMMUNITY
Start: 2021-08-26 | End: 1900-01-01

## 2024-04-05 ENCOUNTER — APPOINTMENT (OUTPATIENT)
Dept: FAMILY MEDICINE | Facility: CLINIC | Age: 49
End: 2024-04-05
Payer: COMMERCIAL

## 2024-04-05 VITALS
HEIGHT: 70 IN | BODY MASS INDEX: 34.5 KG/M2 | TEMPERATURE: 99 F | SYSTOLIC BLOOD PRESSURE: 110 MMHG | WEIGHT: 241 LBS | OXYGEN SATURATION: 98 % | DIASTOLIC BLOOD PRESSURE: 80 MMHG | HEART RATE: 77 BPM

## 2024-04-05 DIAGNOSIS — E55.9 VITAMIN D DEFICIENCY, UNSPECIFIED: ICD-10-CM

## 2024-04-05 DIAGNOSIS — Z12.5 ENCOUNTER FOR SCREENING FOR MALIGNANT NEOPLASM OF PROSTATE: ICD-10-CM

## 2024-04-05 DIAGNOSIS — E66.9 OBESITY, UNSPECIFIED: ICD-10-CM

## 2024-04-05 DIAGNOSIS — R73.03 PREDIABETES.: ICD-10-CM

## 2024-04-05 DIAGNOSIS — R35.0 FREQUENCY OF MICTURITION: ICD-10-CM

## 2024-04-05 DIAGNOSIS — E78.5 HYPERLIPIDEMIA, UNSPECIFIED: ICD-10-CM

## 2024-04-05 PROCEDURE — G2211 COMPLEX E/M VISIT ADD ON: CPT

## 2024-04-05 PROCEDURE — 99214 OFFICE O/P EST MOD 30 MIN: CPT

## 2024-04-05 PROCEDURE — 36415 COLL VENOUS BLD VENIPUNCTURE: CPT

## 2024-04-18 PROBLEM — R35.0 URINARY FREQUENCY: Status: ACTIVE | Noted: 2023-07-18

## 2024-04-18 PROBLEM — E55.9 VITAMIN D INSUFFICIENCY: Status: ACTIVE | Noted: 2021-07-29

## 2024-04-18 PROBLEM — E78.5 DYSLIPIDEMIA: Status: ACTIVE | Noted: 2021-08-26

## 2024-04-18 PROBLEM — E66.9 OBESITY: Status: ACTIVE | Noted: 2024-04-18

## 2024-04-18 PROBLEM — R73.03 PREDIABETES: Status: ACTIVE | Noted: 2023-07-28

## 2024-04-18 PROBLEM — Z12.5 PROSTATE CANCER SCREENING: Status: ACTIVE | Noted: 2023-07-18

## 2024-04-18 LAB
25(OH)D3 SERPL-MCNC: 18.1 NG/ML
ALBUMIN SERPL ELPH-MCNC: 4.5 G/DL
ALP BLD-CCNC: 70 U/L
ALT SERPL-CCNC: 32 U/L
ANION GAP SERPL CALC-SCNC: 14 MMOL/L
APPEARANCE: CLEAR
AST SERPL-CCNC: 28 U/L
BASOPHILS # BLD AUTO: 0.04 K/UL
BASOPHILS NFR BLD AUTO: 0.6 %
BILIRUB SERPL-MCNC: 0.2 MG/DL
BILIRUBIN URINE: NEGATIVE
BLOOD URINE: NEGATIVE
BUN SERPL-MCNC: 14 MG/DL
CALCIUM SERPL-MCNC: 9.4 MG/DL
CHLORIDE SERPL-SCNC: 102 MMOL/L
CO2 SERPL-SCNC: 26 MMOL/L
COLOR: YELLOW
CREAT SERPL-MCNC: 0.99 MG/DL
EGFR: 95 ML/MIN/1.73M2
EOSINOPHIL # BLD AUTO: 0.07 K/UL
EOSINOPHIL NFR BLD AUTO: 1 %
ESTIMATED AVERAGE GLUCOSE: 114 MG/DL
GLUCOSE QUALITATIVE U: NEGATIVE MG/DL
GLUCOSE SERPL-MCNC: 57 MG/DL
HBA1C MFR BLD HPLC: 5.6 %
HCT VFR BLD CALC: 44.6 %
HGB BLD-MCNC: 14.6 G/DL
IMM GRANULOCYTES NFR BLD AUTO: 0.1 %
KETONES URINE: NEGATIVE MG/DL
LEUKOCYTE ESTERASE URINE: NEGATIVE
LYMPHOCYTES # BLD AUTO: 1.35 K/UL
LYMPHOCYTES NFR BLD AUTO: 19.4 %
MAN DIFF?: NORMAL
MCHC RBC-ENTMCNC: 27.6 PG
MCHC RBC-ENTMCNC: 32.7 GM/DL
MCV RBC AUTO: 84.3 FL
MONOCYTES # BLD AUTO: 0.54 K/UL
MONOCYTES NFR BLD AUTO: 7.8 %
NEUTROPHILS # BLD AUTO: 4.94 K/UL
NEUTROPHILS NFR BLD AUTO: 71.1 %
NITRITE URINE: NEGATIVE
PH URINE: 6.5
PLATELET # BLD AUTO: 201 K/UL
POTASSIUM SERPL-SCNC: 4 MMOL/L
PROT SERPL-MCNC: 7 G/DL
PROTEIN URINE: NEGATIVE MG/DL
PSA FREE FLD-MCNC: 51 %
PSA FREE SERPL-MCNC: 0.15 NG/ML
PSA SERPL-MCNC: 0.29 NG/ML
RBC # BLD: 5.29 M/UL
RBC # FLD: 13.8 %
SODIUM SERPL-SCNC: 142 MMOL/L
SPECIFIC GRAVITY URINE: 1.02
UROBILINOGEN URINE: 0.2 MG/DL
WBC # FLD AUTO: 6.95 K/UL

## 2024-04-18 NOTE — HEALTH RISK ASSESSMENT
[Yes] : Yes [Monthly or less (1 pt)] : Monthly or less (1 point) [1 or 2 (0 pts)] : 1 or 2 (0 points) [Never (0 pts)] : Never (0 points) [No falls in past year] : Patient reported no falls in the past year [0] : 2) Feeling down, depressed, or hopeless: Not at all (0) [Current] : Current [DTD1Gqpri] : 0

## 2024-04-18 NOTE — HISTORY OF PRESENT ILLNESS
[de-identified] : 50 yo M with CAD, obesity, HTN, HLD, prediabetes, presenting for follow up, overdue for annual physical, had some concerns regarding previous blood tests and had not been seen, wants repeat. Taking medications as prescribed by the cardiologist.

## 2024-04-18 NOTE — PLAN
[FreeTextEntry1] : Labs drawn in office for chronic conditions, additionally for complaints of increased urination, patient concerned for diabetes, however unlikely. Will call with results. All questions answered Exam reassuring

## 2024-05-09 ENCOUNTER — RX RENEWAL (OUTPATIENT)
Age: 49
End: 2024-05-09

## 2024-05-09 RX ORDER — LOSARTAN POTASSIUM AND HYDROCHLOROTHIAZIDE 12.5; 1 MG/1; MG/1
100-12.5 TABLET ORAL DAILY
Qty: 90 | Refills: 0 | Status: ACTIVE | COMMUNITY
Start: 2021-08-26 | End: 1900-01-01

## 2024-05-17 ENCOUNTER — APPOINTMENT (OUTPATIENT)
Dept: FAMILY MEDICINE | Facility: CLINIC | Age: 49
End: 2024-05-17
Payer: COMMERCIAL

## 2024-05-17 ENCOUNTER — NON-APPOINTMENT (OUTPATIENT)
Age: 49
End: 2024-05-17

## 2024-05-17 VITALS
HEIGHT: 70 IN | HEART RATE: 69 BPM | SYSTOLIC BLOOD PRESSURE: 120 MMHG | BODY MASS INDEX: 33.36 KG/M2 | DIASTOLIC BLOOD PRESSURE: 80 MMHG | WEIGHT: 233 LBS | OXYGEN SATURATION: 96 %

## 2024-05-17 DIAGNOSIS — I10 ESSENTIAL (PRIMARY) HYPERTENSION: ICD-10-CM

## 2024-05-17 DIAGNOSIS — R00.2 PALPITATIONS: ICD-10-CM

## 2024-05-17 PROCEDURE — 99214 OFFICE O/P EST MOD 30 MIN: CPT

## 2024-05-17 PROCEDURE — G2211 COMPLEX E/M VISIT ADD ON: CPT | Mod: NC,1L

## 2024-05-17 PROCEDURE — 93000 ELECTROCARDIOGRAM COMPLETE: CPT

## 2024-05-18 PROBLEM — R00.2 PALPITATIONS: Status: ACTIVE | Noted: 2024-05-18

## 2024-05-18 PROBLEM — I10 HYPERTENSION: Status: ACTIVE | Noted: 2021-08-26

## 2024-05-18 NOTE — HEALTH RISK ASSESSMENT
[No] : No [No falls in past year] : Patient reported no falls in the past year [0] : 2) Feeling down, depressed, or hopeless: Not at all (0) [Current] : Current [[id number]] : 0 [de-identified] : CIGAR SMOKER

## 2024-05-18 NOTE — PLAN
[FreeTextEntry1] : Recommended cardiology eval for palpitations, possible Zio/Holter evaluation, will schedule follow up with Dr. Sanchez EKG appropriate today Regarding lightheadedness when standing up, may be orthostatic but no evidence today, declines to decrease blood pressure medication at this time to trial off. Check fingerstick glucose when lightheaded as well to ensure no hypoglycemia All questions answered Exam reassuring

## 2024-05-18 NOTE — HISTORY OF PRESENT ILLNESS
[FreeTextEntry1] : f/u [de-identified] : 48 yo M with HTN, presenting for evaluation for palpitations, lightheadedness. Reports anna on his health watch reports he had three irregular rhythms, does not show the tracing or what they are. Blood pressures appropriate. Reports feelings of a skipped heart beat at times. Follows with cardiology previously.

## 2024-08-05 ENCOUNTER — RX RENEWAL (OUTPATIENT)
Age: 49
End: 2024-08-05

## 2024-12-16 PROBLEM — E66.3 EXCESS WEIGHT: Status: ACTIVE | Noted: 2023-07-18

## 2024-12-19 ENCOUNTER — APPOINTMENT (OUTPATIENT)
Dept: CARDIOLOGY | Facility: CLINIC | Age: 49
End: 2024-12-19

## 2024-12-19 DIAGNOSIS — Z00.00 ENCOUNTER FOR GENERAL ADULT MEDICAL EXAMINATION W/OUT ABNORMAL FINDINGS: ICD-10-CM

## 2024-12-19 DIAGNOSIS — I10 ESSENTIAL (PRIMARY) HYPERTENSION: ICD-10-CM

## 2024-12-19 DIAGNOSIS — R73.03 PREDIABETES.: ICD-10-CM

## 2024-12-19 DIAGNOSIS — E78.5 HYPERLIPIDEMIA, UNSPECIFIED: ICD-10-CM

## 2024-12-19 DIAGNOSIS — F17.290 NICOTINE DEPENDENCE, OTHER TOBACCO PRODUCT, UNCOMPLICATED: ICD-10-CM

## 2024-12-19 DIAGNOSIS — I25.118 ATHEROSCLEROTIC HEART DISEASE OF NATIVE CORONARY ARTERY WITH OTHER FORMS OF ANGINA PECTORIS: ICD-10-CM

## 2024-12-19 DIAGNOSIS — E66.3 OVERWEIGHT: ICD-10-CM

## 2025-01-27 ENCOUNTER — APPOINTMENT (OUTPATIENT)
Dept: FAMILY MEDICINE | Facility: CLINIC | Age: 50
End: 2025-01-27
Payer: COMMERCIAL

## 2025-01-27 VITALS
DIASTOLIC BLOOD PRESSURE: 70 MMHG | BODY MASS INDEX: 31.78 KG/M2 | SYSTOLIC BLOOD PRESSURE: 110 MMHG | WEIGHT: 222 LBS | HEIGHT: 70 IN

## 2025-01-27 DIAGNOSIS — I10 ESSENTIAL (PRIMARY) HYPERTENSION: ICD-10-CM

## 2025-01-27 DIAGNOSIS — E66.9 OBESITY, UNSPECIFIED: ICD-10-CM

## 2025-01-27 DIAGNOSIS — E78.5 HYPERLIPIDEMIA, UNSPECIFIED: ICD-10-CM

## 2025-01-27 DIAGNOSIS — S39.012A STRAIN OF MUSCLE, FASCIA AND TENDON OF LOWER BACK, INITIAL ENCOUNTER: ICD-10-CM

## 2025-01-27 PROCEDURE — 36415 COLL VENOUS BLD VENIPUNCTURE: CPT

## 2025-01-27 PROCEDURE — 99214 OFFICE O/P EST MOD 30 MIN: CPT

## 2025-01-27 PROCEDURE — G2211 COMPLEX E/M VISIT ADD ON: CPT | Mod: NC

## 2025-01-29 ENCOUNTER — RX RENEWAL (OUTPATIENT)
Age: 50
End: 2025-01-29

## 2025-01-30 ENCOUNTER — APPOINTMENT (OUTPATIENT)
Dept: CARDIOLOGY | Facility: CLINIC | Age: 50
End: 2025-01-30
Payer: COMMERCIAL

## 2025-01-30 ENCOUNTER — NON-APPOINTMENT (OUTPATIENT)
Age: 50
End: 2025-01-30

## 2025-01-30 VITALS
OXYGEN SATURATION: 99 % | TEMPERATURE: 97.9 F | DIASTOLIC BLOOD PRESSURE: 82 MMHG | SYSTOLIC BLOOD PRESSURE: 122 MMHG | HEART RATE: 79 BPM

## 2025-01-30 DIAGNOSIS — I10 ESSENTIAL (PRIMARY) HYPERTENSION: ICD-10-CM

## 2025-01-30 DIAGNOSIS — R73.03 PREDIABETES.: ICD-10-CM

## 2025-01-30 DIAGNOSIS — I25.118 ATHEROSCLEROTIC HEART DISEASE OF NATIVE CORONARY ARTERY WITH OTHER FORMS OF ANGINA PECTORIS: ICD-10-CM

## 2025-01-30 DIAGNOSIS — Z00.00 ENCOUNTER FOR GENERAL ADULT MEDICAL EXAMINATION W/OUT ABNORMAL FINDINGS: ICD-10-CM

## 2025-01-30 PROCEDURE — 93000 ELECTROCARDIOGRAM COMPLETE: CPT

## 2025-01-30 PROCEDURE — 99214 OFFICE O/P EST MOD 30 MIN: CPT | Mod: 25

## 2025-01-31 PROBLEM — S39.012A LOW BACK STRAIN: Status: ACTIVE | Noted: 2025-01-27

## 2025-02-06 ENCOUNTER — RX RENEWAL (OUTPATIENT)
Age: 50
End: 2025-02-06

## 2025-02-06 LAB
25(OH)D3 SERPL-MCNC: 16.9 NG/ML
ALBUMIN SERPL ELPH-MCNC: 4.7 G/DL
ALP BLD-CCNC: 66 U/L
ALT SERPL-CCNC: 31 U/L
ANION GAP SERPL CALC-SCNC: 14 MMOL/L
AST SERPL-CCNC: 25 U/L
BASOPHILS # BLD AUTO: 0.09 K/UL
BASOPHILS NFR BLD AUTO: 1.1 %
BILIRUB SERPL-MCNC: 0.4 MG/DL
BUN SERPL-MCNC: 17 MG/DL
CALCIUM SERPL-MCNC: 9.5 MG/DL
CHLORIDE SERPL-SCNC: 100 MMOL/L
CHOLEST SERPL-MCNC: 162 MG/DL
CO2 SERPL-SCNC: 24 MMOL/L
CREAT SERPL-MCNC: 0.88 MG/DL
EGFR: 107 ML/MIN/1.73M2
EOSINOPHIL # BLD AUTO: 0.03 K/UL
EOSINOPHIL NFR BLD AUTO: 0.4 %
ESTIMATED AVERAGE GLUCOSE: 117 MG/DL
GLUCOSE SERPL-MCNC: 95 MG/DL
HBA1C MFR BLD HPLC: 5.7 %
HCT VFR BLD CALC: 48.4 %
HDLC SERPL-MCNC: 56 MG/DL
HGB BLD-MCNC: 15.9 G/DL
IMM GRANULOCYTES NFR BLD AUTO: 0.2 %
LDLC SERPL CALC-MCNC: 90 MG/DL
LYMPHOCYTES # BLD AUTO: 1.95 K/UL
LYMPHOCYTES NFR BLD AUTO: 23.7 %
MAN DIFF?: NORMAL
MCHC RBC-ENTMCNC: 28.2 PG
MCHC RBC-ENTMCNC: 32.9 G/DL
MCV RBC AUTO: 85.8 FL
MONOCYTES # BLD AUTO: 0.59 K/UL
MONOCYTES NFR BLD AUTO: 7.2 %
NEUTROPHILS # BLD AUTO: 5.56 K/UL
NEUTROPHILS NFR BLD AUTO: 67.4 %
NONHDLC SERPL-MCNC: 106 MG/DL
PLATELET # BLD AUTO: 206 K/UL
POTASSIUM SERPL-SCNC: 4.2 MMOL/L
PROT SERPL-MCNC: 7.4 G/DL
RBC # BLD: 5.64 M/UL
RBC # FLD: 13.8 %
SODIUM SERPL-SCNC: 138 MMOL/L
TRIGL SERPL-MCNC: 89 MG/DL
WBC # FLD AUTO: 8.24 K/UL

## 2025-02-06 RX ORDER — ERGOCALCIFEROL 1.25 MG/1
1.25 MG CAPSULE, LIQUID FILLED ORAL
Qty: 16 | Refills: 1 | Status: ACTIVE | COMMUNITY
Start: 2025-02-06 | End: 1900-01-01

## 2025-03-18 DIAGNOSIS — M54.50 LOW BACK PAIN, UNSPECIFIED: ICD-10-CM

## 2025-03-19 ENCOUNTER — APPOINTMENT (OUTPATIENT)
Dept: ORTHOPEDIC SURGERY | Facility: CLINIC | Age: 50
End: 2025-03-19
Payer: COMMERCIAL

## 2025-03-19 VITALS
OXYGEN SATURATION: 97 % | SYSTOLIC BLOOD PRESSURE: 126 MMHG | HEIGHT: 70 IN | BODY MASS INDEX: 31.78 KG/M2 | HEART RATE: 80 BPM | DIASTOLIC BLOOD PRESSURE: 68 MMHG | WEIGHT: 222 LBS

## 2025-03-19 DIAGNOSIS — M54.16 RADICULOPATHY, LUMBAR REGION: ICD-10-CM

## 2025-03-19 PROCEDURE — G2211 COMPLEX E/M VISIT ADD ON: CPT | Mod: NC

## 2025-03-19 PROCEDURE — 99204 OFFICE O/P NEW MOD 45 MIN: CPT

## 2025-03-21 ENCOUNTER — RESULT REVIEW (OUTPATIENT)
Age: 50
End: 2025-03-21

## 2025-04-03 ENCOUNTER — APPOINTMENT (OUTPATIENT)
Dept: FAMILY MEDICINE | Facility: CLINIC | Age: 50
End: 2025-04-03

## 2025-04-05 ENCOUNTER — RESULT REVIEW (OUTPATIENT)
Age: 50
End: 2025-04-05

## 2025-04-16 ENCOUNTER — APPOINTMENT (OUTPATIENT)
Dept: ORTHOPEDIC SURGERY | Facility: CLINIC | Age: 50
End: 2025-04-16
Payer: COMMERCIAL

## 2025-04-16 VITALS
BODY MASS INDEX: 31.5 KG/M2 | DIASTOLIC BLOOD PRESSURE: 74 MMHG | SYSTOLIC BLOOD PRESSURE: 112 MMHG | WEIGHT: 220 LBS | HEART RATE: 76 BPM | HEIGHT: 70 IN | OXYGEN SATURATION: 96 %

## 2025-04-16 DIAGNOSIS — M54.16 RADICULOPATHY, LUMBAR REGION: ICD-10-CM

## 2025-04-16 PROCEDURE — G2211 COMPLEX E/M VISIT ADD ON: CPT | Mod: NC

## 2025-04-16 PROCEDURE — 99214 OFFICE O/P EST MOD 30 MIN: CPT

## 2025-07-31 ENCOUNTER — NON-APPOINTMENT (OUTPATIENT)
Age: 50
End: 2025-07-31

## 2025-07-31 ENCOUNTER — APPOINTMENT (OUTPATIENT)
Dept: CARDIOLOGY | Facility: CLINIC | Age: 50
End: 2025-07-31
Payer: COMMERCIAL

## 2025-07-31 VITALS
OXYGEN SATURATION: 98 % | DIASTOLIC BLOOD PRESSURE: 78 MMHG | TEMPERATURE: 98.1 F | SYSTOLIC BLOOD PRESSURE: 128 MMHG | HEART RATE: 75 BPM

## 2025-07-31 DIAGNOSIS — I10 ESSENTIAL (PRIMARY) HYPERTENSION: ICD-10-CM

## 2025-07-31 DIAGNOSIS — Z00.00 ENCOUNTER FOR GENERAL ADULT MEDICAL EXAMINATION W/OUT ABNORMAL FINDINGS: ICD-10-CM

## 2025-07-31 DIAGNOSIS — R07.89 OTHER CHEST PAIN: ICD-10-CM

## 2025-07-31 DIAGNOSIS — R73.03 PREDIABETES.: ICD-10-CM

## 2025-07-31 DIAGNOSIS — K21.9 GASTRO-ESOPHAGEAL REFLUX DISEASE W/OUT ESOPHAGITIS: ICD-10-CM

## 2025-07-31 DIAGNOSIS — E78.5 HYPERLIPIDEMIA, UNSPECIFIED: ICD-10-CM

## 2025-07-31 PROCEDURE — 36415 COLL VENOUS BLD VENIPUNCTURE: CPT

## 2025-07-31 PROCEDURE — 93000 ELECTROCARDIOGRAM COMPLETE: CPT

## 2025-07-31 PROCEDURE — 99214 OFFICE O/P EST MOD 30 MIN: CPT

## 2025-08-01 LAB
ALBUMIN SERPL ELPH-MCNC: 4.7 G/DL
ALP BLD-CCNC: 70 U/L
ALT SERPL-CCNC: 25 U/L
ANION GAP SERPL CALC-SCNC: 13 MMOL/L
AST SERPL-CCNC: 27 U/L
BILIRUB SERPL-MCNC: 0.4 MG/DL
BUN SERPL-MCNC: 20 MG/DL
CALCIUM SERPL-MCNC: 9.5 MG/DL
CHLORIDE SERPL-SCNC: 101 MMOL/L
CHOLEST SERPL-MCNC: 149 MG/DL
CO2 SERPL-SCNC: 26 MMOL/L
CREAT SERPL-MCNC: 0.91 MG/DL
EGFRCR SERPLBLD CKD-EPI 2021: 104 ML/MIN/1.73M2
GLUCOSE SERPL-MCNC: 97 MG/DL
HCT VFR BLD CALC: 47.3 %
HDLC SERPL-MCNC: 51 MG/DL
HGB BLD-MCNC: 15.2 G/DL
LDLC SERPL-MCNC: 73 MG/DL
MCHC RBC-ENTMCNC: 29.2 PG
MCHC RBC-ENTMCNC: 32.1 G/DL
MCV RBC AUTO: 90.8 FL
NONHDLC SERPL-MCNC: 98 MG/DL
PLATELET # BLD AUTO: 193 K/UL
POTASSIUM SERPL-SCNC: 4.9 MMOL/L
PROT SERPL-MCNC: 7.4 G/DL
RBC # BLD: 5.21 M/UL
RBC # FLD: 13.4 %
SODIUM SERPL-SCNC: 141 MMOL/L
TRIGL SERPL-MCNC: 143 MG/DL
WBC # FLD AUTO: 7.59 K/UL

## 2025-08-07 ENCOUNTER — RX RENEWAL (OUTPATIENT)
Age: 50
End: 2025-08-07